# Patient Record
Sex: MALE | Race: WHITE | NOT HISPANIC OR LATINO | Employment: OTHER | URBAN - METROPOLITAN AREA
[De-identification: names, ages, dates, MRNs, and addresses within clinical notes are randomized per-mention and may not be internally consistent; named-entity substitution may affect disease eponyms.]

---

## 2017-09-12 ENCOUNTER — PATIENT OUTREACH (OUTPATIENT)
Dept: HEALTH INFORMATION MANAGEMENT | Facility: OTHER | Age: 82
End: 2017-09-12

## 2017-09-12 NOTE — PROGRESS NOTES
Attempt #:1    WebIZ Checked & Epic Updated: yes  [facility] Verified: no  Verify PCP: yes    Communication Preference Obtained: yes     Review Care Team: yes    Annual Wellness Visit Scheduling  1. Scheduling Status:Scheduled     Care Gap Scheduling (Attempt to Schedule EACH Overdue Care Gap!)     Health Maintenance Due   Topic Date Due   • DIABETES MONOFILAMENT / LE EXAM  Informed    • FASTING LIPID PROFILE  Informed    • URINE ACR / MICROALBUMIN  Informed   • SERUM CREATININE  Informed   • A1C SCREENING  Infromed   • RETINAL SCREENING  Informed   • IMM INFLUENZA (1) Informed          MyChart Activation: declined  [employer] Baltazar: no  Virtual Visits: no  Opt In to Text Messages: no

## 2018-02-01 PROBLEM — R73.9 HYPERGLYCEMIA: Status: ACTIVE | Noted: 2018-02-01

## 2019-03-15 PROBLEM — G20.A1 PARKINSON'S DISEASE (HCC): Status: ACTIVE | Noted: 2019-03-15

## 2019-03-15 PROBLEM — R73.9 HYPERGLYCEMIA: Status: RESOLVED | Noted: 2018-02-01 | Resolved: 2019-03-15

## 2019-04-10 PROBLEM — M70.22 OLECRANON BURSITIS OF LEFT ELBOW: Status: ACTIVE | Noted: 2019-04-10

## 2019-09-23 ENCOUNTER — PATIENT OUTREACH (OUTPATIENT)
Dept: HEALTH INFORMATION MANAGEMENT | Facility: OTHER | Age: 84
End: 2019-09-23

## 2019-09-23 ENCOUNTER — PREP FOR PROCEDURE (OUTPATIENT)
Dept: HEALTH INFORMATION MANAGEMENT | Facility: OTHER | Age: 84
End: 2019-09-23

## 2019-09-23 NOTE — PROGRESS NOTES
Outcome: Left Message    Please transfer to Patient Outreach Team at [phone] when patient returns call.    Attempt # 1

## 2019-12-23 PROBLEM — M70.22 OLECRANON BURSITIS OF LEFT ELBOW: Status: RESOLVED | Noted: 2019-04-10 | Resolved: 2019-12-23

## 2020-09-16 PROBLEM — E87.6 HYPOKALEMIA: Status: ACTIVE | Noted: 2020-09-16

## 2020-09-19 PROBLEM — E87.6 HYPOKALEMIA: Status: RESOLVED | Noted: 2020-09-16 | Resolved: 2020-09-19

## 2021-01-13 DIAGNOSIS — Z23 NEED FOR VACCINATION: ICD-10-CM

## 2021-09-23 PROBLEM — D50.9 IRON DEFICIENCY ANEMIA: Status: ACTIVE | Noted: 2021-09-23

## 2021-09-23 PROBLEM — R91.1 PULMONARY NODULE: Status: ACTIVE | Noted: 2021-09-23

## 2021-09-23 PROBLEM — R13.13 PHARYNGEAL DYSPHAGIA: Status: ACTIVE | Noted: 2021-09-23

## 2021-09-23 PROBLEM — E27.8 ADRENAL MASS (HCC): Status: ACTIVE | Noted: 2021-09-23
